# Patient Record
Sex: MALE | Employment: FULL TIME | URBAN - METROPOLITAN AREA
[De-identification: names, ages, dates, MRNs, and addresses within clinical notes are randomized per-mention and may not be internally consistent; named-entity substitution may affect disease eponyms.]

---

## 2020-03-03 NOTE — PROGRESS NOTES
CHIEF COMPLAINT:    Mr. Henry is a 59 y.o. male presenting for consultation at the request of Felipe Ferrer PA-C. Patient presents for elevated PSA.    PRESENTING ILLNESS:    Giovanni Henry Jr. is a 59 y.o. male new patient to me (records of past medical, family and social history personally reviewed by me), w/ h/o hypothyroidism, low back pain/DDD, tobacco use (quit 2000), kidney stones (last event about 1-2 yrs ago), BPH, and ED.    Today pt presents to clinic on direction of PCP for elevated PSA. Reports feeling well, denies urinary complaints.   Denies urinary frequency, urinary urgency/UUI, nocturia.  On tams[facility]osin x1 yr[facility], denies adv effects.  Strong to moderate FOS.  Feels empty pot voids.  Denies dysuria, GH.  Denies constipation.  Currently taking sildenafil 50mg PRN for difficulty maintaining erections sometimes, generally does not have ED issues.  Denies prostate/renal/bladder CA.  Retired from [employer].    UA dip in clinic today revealed trace protein, 50 glucose.    PVR in clinic today: 28mL.    REVIEW OF SYSTEMS:    Giovanni Henry Jr. denies headache, blurred vision, fever, nausea, vomiting, chills, abdominal pain, pelvic pain, flank pain, bleeding per rectum, cough, SOB, recent loss of consciousness, recent mental status changes, seizures, dizziness, or upper or lower extremity weakness.    PATIENT HISTORY:    Past Medical History:   Diagnosis Date    Thyroid disease      No past surgical history on file.    No family history on file.    Social History     Socioeconomic History    Marital status: Unknown     Spouse name: Not on file    Number of children: Not on file    Years of education: Not on file    Highest education level: Not on file   Occupational History    Not on file   Social Needs    Financial resource strain: Not on file    Food insecurity:     Worry: Not on file     Inability: Not on file    Transportation needs:     Medical: Not on file      Non-medical: Not on file   Tobacco Use    Smoking status: Former Smoker    Smokeless tobacco: Never Used   Substance and Sexual Activity    Alcohol use: Yes    Drug use: Not Currently    Sexual activity: Not on file   Lifestyle    Physical activity:     Days per week: Not on file     Minutes per session: Not on file    Stress: Not on file   Relationships    Social connections:     Talks on phone: Not on file     Gets together: Not on file     Attends Church service: Not on file     Active member of club or organization: Not on file     Attends meetings of clubs or organizations: Not on file     Relationship status: Not on file   Other Topics Concern    Not on file   Social History Narrative    Not on file       Allergies:  Patient has no known allergies.    Medications:    Current Outpatient Medications:     levothyroxine (SYNTHROID) 25 MCG tablet, Take 25 mcg by mouth before breakfast., Disp: , Rfl:     methocarbamol (ROBAXIN) 500 MG Tab, Take 500 mg by mouth 4 (four) times daily., Disp: , Rfl:     naproxen (NAPROSYN) 500 MG tablet, Take 500 mg by mouth 2 (two) times daily., Disp: , Rfl:     omeprazole (PRILOSEC) 20 MG capsule, Take 20 mg by mouth once daily., Disp: , Rfl:     tamsulosin (FLOMAX) 0.4 mg Cap, Take 0.4 mg by mouth once daily., Disp: , Rfl:     PHYSICAL EXAMINATION:    The patient generally appears in good health, is appropriately interactive, and is in no apparent distress.     Eyes: anicteric sclerae, moist conjunctivae; no lid-lag; PERRLA     HENT: Atraumatic; oropharynx clear with moist mucous membranes and no mucosal ulcerations;normal hard and soft palate. No evidence of lymphadenopathy.    Neck: Trachea midline.  No thyromegaly.    Musculoskeletal: No abnormal gait.    Skin: No lesions.    Mental: Cooperative with normal affect.  Is oriented to time, place, and person.    Neuro: Grossly intact.    Chest: Normal inspiratory effort.   No accessory muscles.  No audible wheezes.   Respirations symmetric on inspiration and expiration.    Heart: Regular rhythm.      Abdomen:  Soft, non-tender. No masses or organomegaly. No evidence of flank discomfort. No evidence of inguinal hernia.    No penile discharge. Meatus w/o stenosis or lesions. Phallus unremarkable. No scrotal enlargement/edema/swelling/lesions. Epididymis unremarkable bilaterally. Testicles normal w/o nodules or tenderness. No inguinal hernias or lymphadenopathy.    The prostate is smooth, symmetrical, without nodule or induration. The seminal vesicles were normal and nonpalpable. The prostate was not tender or boggy. Rectal tone was normal no rectal mass was palpable.    Extremities: No clubbing, cyanosis, or edema.    LABS:    No results found for: CREATININE    PSA 3.96 [facility]  PSA 4.81 ([facility]/20 [facility]).    No results found for: HGBA1C    No results found for: TOTALTESTOST      IMPRESSION:    Elevated PSA  -     POCT urine dipstick without microscope  -     POCT Bladder Scan  -     Prostate Specific Antigen, Diagnostic; Future; Expected date: 06/04/2020  -     PROSTATE HEALTH INDEX (phi); Future; Expected date: 06/04/2020    History of kidney stones  -     US Retroperitoneal Complete (Kidney and; Future; Expected date: 03/04/2020  -     X-Ray KUB    Erectile dysfunction due to arterial insufficiency      PLAN:    I spent 45 minutes with the patient of which more than half was spent in direct consultation with the patient in regards to our treatment and plan.      Discussed and reviewed past, and recent PSA.  Discussed and reviewed PHI score, and %free PSA.  Reviewed benefits/potential burdens/risks w/ specialized testing.  Discussed and reviewed potential etiologies for elevated PSA.  Discussed and reviewed prostate CA screening w/ PSA, and DIONISIO. Reviewed benefits, potential burdens, risks and/or harms related to screening.  Discussed and reviewed TRUS bx, benefits, potential burdens/risks/adv effects.  Reasonable to repeat  PSA w/ specialized testing in 3 months, pt amenable to plan.  Advised to avoid ejaculation/bike riding/prolonged sitting 5-7 days prior to testing.    Discussed and reviewed the natural history of renal/ureteral calculi and prevention of calculi. Recommend updating imaging today w/ LEONORA and KUB. Pt amenable to plan. Patient understands to contact clinic or report to local ED if clinic is closed for fevers, rigors, unmanageable pain, intractable nausea/vomiting and/or intolerable irritative voiding symptoms. Discussed and reviewed importance of proper hydration (2-3L/d), low sodium diet, low oxalate/animal protein intake, balanced nutritional calcium intake, and increase citrate intake w/ fresh lemon.    Discussed and reviewed ED, potential etiologies.  May continue w/ sildenafil 50mg PRN.  Take on empty stomach.  Do not exceed 100mg/24hrs.  Do not take w/ nitrates.    Education sheets provided.    Follow up in about 3 months (around 6/4/2020) for elevated PSA w/ PSA.    Pt expressed understanding and agree w/ plan.

## 2020-03-04 ENCOUNTER — OFFICE VISIT (OUTPATIENT)
Dept: UROLOGY | Facility: CLINIC | Age: 60
End: 2020-03-04
Payer: COMMERCIAL

## 2020-03-04 VITALS
SYSTOLIC BLOOD PRESSURE: 138 MMHG | HEIGHT: 72 IN | WEIGHT: 200.63 LBS | BODY MASS INDEX: 27.17 KG/M2 | DIASTOLIC BLOOD PRESSURE: 79 MMHG | HEART RATE: 82 BPM

## 2020-03-04 DIAGNOSIS — Z87.442 HISTORY OF KIDNEY STONES: ICD-10-CM

## 2020-03-04 DIAGNOSIS — N52.01 ERECTILE DYSFUNCTION DUE TO ARTERIAL INSUFFICIENCY: ICD-10-CM

## 2020-03-04 DIAGNOSIS — R97.20 ELEVATED PSA: Primary | ICD-10-CM

## 2020-03-04 LAB
BILIRUB SERPL-MCNC: NORMAL MG/DL
BLOOD URINE, POC: NORMAL
COLOR, POC UA: NORMAL
GLUCOSE UR QL STRIP: 50
KETONES UR QL STRIP: NORMAL
LEUKOCYTE ESTERASE URINE, POC: NORMAL
NITRITE, POC UA: NORMAL
PH, POC UA: 5
POC RESIDUAL URINE VOLUME: 28 ML (ref 0–100)
PROTEIN, POC: NORMAL
SPECIFIC GRAVITY, POC UA: 1.02
UROBILINOGEN, POC UA: NORMAL

## 2020-03-04 PROCEDURE — 81002 URINALYSIS NONAUTO W/O SCOPE: CPT | Mod: PBBFAC | Performed by: NURSE PRACTITIONER

## 2020-03-04 PROCEDURE — 51798 US URINE CAPACITY MEASURE: CPT | Mod: PBBFAC | Performed by: NURSE PRACTITIONER

## 2020-03-04 PROCEDURE — 99203 OFFICE O/P NEW LOW 30 MIN: CPT | Mod: PBBFAC,25 | Performed by: NURSE PRACTITIONER

## 2020-03-04 PROCEDURE — 99999 PR PBB SHADOW E&M-NEW PATIENT-LVL III: ICD-10-PCS | Mod: PBBFAC,,, | Performed by: NURSE PRACTITIONER

## 2020-03-04 PROCEDURE — 99204 PR OFFICE/OUTPT VISIT, NEW, LEVL IV, 45-59 MIN: ICD-10-PCS | Mod: S$PBB,,, | Performed by: NURSE PRACTITIONER

## 2020-03-04 PROCEDURE — 99204 OFFICE O/P NEW MOD 45 MIN: CPT | Mod: S$PBB,,, | Performed by: NURSE PRACTITIONER

## 2020-03-04 PROCEDURE — 99999 PR PBB SHADOW E&M-NEW PATIENT-LVL III: CPT | Mod: PBBFAC,,, | Performed by: NURSE PRACTITIONER

## 2020-03-04 RX ORDER — NAPROXEN 500 MG/1
500 TABLET ORAL 2 TIMES DAILY
COMMUNITY

## 2020-03-04 RX ORDER — TAMSULOSIN HYDROCHLORIDE 0.4 MG/1
0.4 CAPSULE ORAL DAILY
COMMUNITY

## 2020-03-04 RX ORDER — OMEPRAZOLE 20 MG/1
20 CAPSULE, DELAYED RELEASE ORAL DAILY
COMMUNITY

## 2020-03-04 RX ORDER — METHOCARBAMOL 500 MG/1
500 TABLET, FILM COATED ORAL 4 TIMES DAILY
COMMUNITY

## 2020-03-04 RX ORDER — LEVOTHYROXINE SODIUM 25 UG/1
25 TABLET ORAL
COMMUNITY

## 2020-03-04 NOTE — PATIENT INSTRUCTIONS
Prostate-Specific Antigen (PSA)  Does this test have other names?  PSA  What is this test?  This test measures the level of prostate-specific antigen (PSA) in your blood.  The cells of the prostate gland make the protein called PSA. Men normally have low levels of PSA. If your PSA levels start to rise, it could mean you have prostate cancer, benign prostate conditions, inflammation, or an infection.  PSA testing is controversial because the U.S. Preventative Services Task Force discourages men who don't have any symptoms of prostate cancer from being screened. The task force says that PSA test results can lead to treating small cancers that would never become life-threatening.  But the American Cancer Society believes men should be told the risks and benefits of PSA testing and allowed to make their own decision about if and when to be screened.  The American Urologic Society says that PSA screening is most important between the ages of 55 and 69. If you have a brother or father with prostate cancer or you are , you should start testing at age 40.   Why do I need this test?  You may have this test if you are 50 or older and your healthcare provider wants to screen you for prostate cancer. Some providers recommend screening at age 40 or 45 if you have a family history of prostate cancer or other risk factors.  You may also have this test if you have already been diagnosed with prostate cancer, so that your healthcare provider can monitor your treatment and see whether your cancer has come back.  What other tests might I have along with this test?  Your healthcare provider may also do a digital rectal exam (DIONISIO). A DIONISIO is a physical exam of the prostate, not a lab test. For the exam, your provider will place a gloved finger in your rectum and feel the prostate to check for any bumps or abnormal areas. The FDA recommends that a DIONISIO be done along with a PSA test.  What do my test results mean?  Many  things may affect your lab test results. These include the method each lab uses to do the test. Even if your test results are different from the normal value, you may not have a problem. To learn what the results mean for you, talk with your healthcare provider.  Results are given in nanograms per milliliter ng/mL. Normal results are below 4.0 ng/mL. A rising PSA may mean that you have cancer. But the PSA results alone won't tell your healthcare provider whether it's cancer or a benign prostate condition. If your provider suspects cancer, he or she will likely suggest that you have a biopsy of the prostate to make the diagnosis.  How is this test done?  The test requires a blood sample, which is drawn through a needle from a vein in your arm.  Does this test pose any risks?  Taking a blood sample with a needle carries risks that include bleeding, infection, bruising, or feeling dizzy. When the needle pricks your arm, you may feel a slight stinging sensation or pain. Afterward, the site may be slightly sore.  What might affect my test results?  An infection can affect your results, as can certain medicines. Riding a bicycle and ejaculation may also affect your results.  How do I get ready for this test?  You may need to abstain from sex and not ride a bicycle within 1 to 2 days of the test. In addition, be sure your healthcare provider knows about all medicines, herbs, vitamins, and supplements you are taking. This includes medicines that don't need a prescription and any illicit drugs you may use.     © 7204-2756 [employer]. [address] [place] [address], [place] [address]. All rights reserved. This information is not intended as a substitute for professional medical care. Always follow your healthcare professional's instructions.        Preventing Kidney Stones  If youve had a kidney stone, you may worry that youll have another. Removing or passing your stone doesnt prevent future stones. But with your  healthcare providers help, you can reduce your risk of forming new stones. Follow up with your healthcare provider to help find new stones. You may need follow-up every 3 months to a year for a lifetime.    Drink lots of water  Staying well-hydrated is the best way to reduce your risk of future stones. Drink 8 12-ounce glasses of water daily. Have 2 with each meal and 2 between meals. Try keeping a pitcher of water nearby during the day and at night.  Take medicines if needed  Medicines, including vitamins and minerals, may be prescribed for certain types of stones. You may want to write your doses and medicine times on a calendar. Some medicines decrease stone-forming chemicals in your blood. Others help prevent those chemicals from crystallizing in urine. Still others help keep a normal acid balance in your urine.  Follow your prescribed diet  Your healthcare provider will tell you which foods contain the chemicals you should avoid. Your healthcare provider may also suggest talking to a dietitian. He or she can help you plan meals youll enjoy. These meals wont put you at risk for future stones. You may be told to limit certain foods, depending on which type of stones youve had. You should limit the amount of salt in your food to about 2 grams a day. This will help prevent most types of kidney stones. Make sure you get an adequate amount of calcium in your diet.  For calcium oxalate stones: Limit animal protein, such as meat, eggs, and fish. Limit grapefruit juice and alcohol. Limit high-oxalate foods (such as cola, tea, chocolate, spinach, rhubarb, wheat bran, and peanuts).  For uric acid stones: Limit high-purine foods, such as mushrooms, peas, beans, anchovies, meat, poultry, shellfish, and organ meats. These foods increase uric acid production.  For cystine stones: Limit high-methionine foods (fish is the most common, but eggs and meats, also). These foods increase production of cystine.  Date Last  Reviewed: 2/1/2017 © 2000-2017 [employer]. [address] [place] [address], [place] [address]. All rights reserved. This information is not intended as a substitute for professional medical care. Always follow your healthcare professional's instructions.        Understanding Kidney Stones  Your kidneys are bean-shaped organs. They help filter extra salts, waste, and water from your body. You need to drink enough water every day to help flush the extra salts into your urine.     What are kidney stones?  Kidney stones are made up of chemical crystals that separate out from urine. These crystals clump together to make stones. They form in the calyx of the kidney. They may stay in the kidney or move into the urinary tract.   Why kidney stones form  Kidneys form stones for many reasons. If you dont drink enough water, for instance, you wont have enough urine to dilute chemicals. Then the chemicals may form crystals, which can develop into stones. Here are some reasons why kidney stones form:  · Fluid loss (dehydration). This can concentrate urine, causing stones to form.  · Certain foods. Some foods contain large amounts of the chemicals that sometimes crystallize into stones. Eating foods that contain a lot of meat or salt can lead to more kidney stones.  · Kidney infections. These infections foster stones by slowing urine flow or changing the acid balance of your urine.  · Family history. If family members have had kidney stones, youre more likely to have them, too.  · A lack of certain substances in your urine. Some substances can help protect you from forming stones. If you dont have enough of these in your urine, stone formation can increase.  Where stones form  Stones begin in the cup-shaped part of the kidney (calyx). Some stay in the calyx and grow. Others move into the kidney, pelvis, or into the ureter. There they can lodge, block the flow of urine, and cause pain.  Symptoms  Many stones cause sudden  and severe pain and bloody urine. Others cause nausea or frequent, burning urination. Symptoms often depend on your stones size and location. Fever may indicate a serious infection. Call your healthcare provider right away if you develop a fever.  Date Last Reviewed: 1/1/2017 © 2000-2017 [employer]. [address] [place] [address]. All rights reserved. This information is not intended as a substitute for professional medical care. Always follow your healthcare professional's instructions.        Oral Medicines for Erectile Dysfunction  You can use prescription oral medicine for ED. They often work well. But, like all medicines, they can have side effects. Also, you cant use them if you have certain health problems or take certain other medicines. Talk with your doctor about oral ED medicine. Ask whether it is right for you.  Types of oral ED medicines  There are three types of prescription oral ED medicines. Each one increases blood flow to the penis. When the penis is stimulated, an erection results. The types are:  · Sildenafil citrate   · Tadalafil   · Vardenafil  · Avanfil  What oral ED medicines dont do  There are some things ED medicines cant do.  · They dont cure the cause of your ED. Without the medicine, youll still have trouble getting an erection.  · They cant produce an erection without sexual stimulation.  · They wont increase sexual desire. They also wont solve any other sexual issues. Psychological, emotional, or relationship issues will not be fixed.  Taking oral ED medicines safely  · Do not combine ED medicines with other treatments unless your doctor tells you to.  · Dont take ED medicines more often or in larger doses than prescribed.  · Tell your doctor your health history. Mention all medicines you take. This includes over-the-counter drugs, supplements, and herbs.  · Ask your doctor about whether you can drink alcohol while taking ED medication.  Possible  side effects of oral ED medicines  · Headache  · Facial flushing  · Runny or stuffy nose  · Indigestion  · Distortion of your color vision for a short time  · Sudden vision loss or hearing loss (rare)  · Dizziness  Risks of oral ED medicines  · Do not take ED medicines if you take medicines containing nitrates. The combination may drop your blood pressure to a dangerous level. Nitrates include nitroglycerin (a drug for angina or chest pain). They are also in poppers, an inhaled recreational drug. If youre not sure whether youre taking nitrates, ask your doctor or pharmacist.  · Medicines called alpha-blockers can interact with ED medicines. They can cause a sudden drop in blood pressure. Alpha-blockers are a common treatment for prostate problems. They also treat high blood pressure. Be sure your doctor knows if you take these medicines.  · If youve had a heart attack or have heart disease and you have not had sex for a while, talk to your doctor. Having sex again can put extra strain on your heart. Your doctor can confirm that your heart is healthy enough for sex.  · It is rare, but some men taking ED medicines have had sudden vision loss. This may be more likely if other health problems are present. These include high blood pressure and diabetes. Ask your doctor if you are at risk for this type of vision loss.  · In rare cases, an erection may last too long. This can badly damage the blood vessels in your penis. If an erection lasts longer than 4 hours, go to the emergency room right away.   Date Last Reviewed: 1/1/2017  © 7900-6116 [employer]. [address] [place] [address], [place] [address]. All rights reserved. This information is not intended as a substitute for professional medical care. Always follow your healthcare professional's instructions.        Understanding Erectile Dysfunction    Erectile dysfunction (ED) is a problem getting an erection firm enough or keeping it long enough for  intercourse. The problem can happen to any man at any age. But health problems that can lead to ED become more common as a man ages. Up to half of men over age 40 experience ED at some point.  Causes of ED  ED can have many causes. Most are physical. Some are emotional issues. Often, a combination of causes is involved. Causes of ED may include:  · Medical conditions such as diabetes or depression  · Smoking tobacco or marijuana  · Drinking too much alcohol  · Side effects of medications  · Injury to nerves or blood vessels  · Emotional issues such as stress or relationship problems  ED can be treated  Prescription medications for ED are available. They help many men who try them. Depending upon the cause of the ED, though, medications may not be enough. In these cases, other treatment options are available. These include erectile aids and surgery. Your health care provider can tell you more about the treatment that is right for you. And new treatments for ED are being studied. No matter what the treatment you decide on, stay in touch with your doctor. If your symptoms persist, he or she may be able to adjust your current treatment or try something new.  Date Last Reviewed: 1/1/2017  © 2486-2371 [employer]. [address] [place] [address], [place] [address]. All rights reserved. This information is not intended as a substitute for professional medical care. Always follow your healthcare professional's instructions.      100

## 2020-03-04 NOTE — LETTER
March 4, 2020      Felipe Ferrer PA-C  [address]  [address]  [facility] [address]           [facility]  [address]  [place] [address]  Phone: [phone]          Patient: Giovanni Herny Jr.   MR Number: [id number]   YOB: 1960   Date of Visit: 3/4/2020       Dear Felipe Ferrer:    Thank you for referring Giovanni Henry to me for evaluation. Attached you will find relevant portions of my assessment and plan of care.    If you have questions, please do not hesitate to call me. I look forward to following Giovanni Henry along with you.    Sincerely,    Jean Marie Baxter, DNP    Enclosure  CC:  No Recipients    If you would like to receive this communication electronically, please contact [email or web address] or [phone] to request more information on [employer] Link access.    For providers and/or their staff who would like to refer a patient to [facility], please contact us through our one-stop-shop provider referral line, [facility] , at [phone].    If you feel you have received this communication in error or would no longer like to receive these types of communications, please e-mail [email or web address]

## 2020-03-06 ENCOUNTER — HOSPITAL ENCOUNTER (OUTPATIENT)
Dept: RADIOLOGY | Facility: HOSPITAL | Age: 60
Discharge: HOME | End: 2020-03-06
Attending: NURSE PRACTITIONER
Payer: COMMERCIAL

## 2020-03-06 DIAGNOSIS — Z87.442 HISTORY OF KIDNEY STONES: ICD-10-CM

## 2020-03-06 PROCEDURE — 74018 XR KUB: ICD-10-PCS | Mod: 26,,, | Performed by: RADIOLOGY

## 2020-03-06 PROCEDURE — 74018 RADEX ABDOMEN 1 VIEW: CPT | Mod: TC

## 2020-03-06 PROCEDURE — 76770 US RETROPERITONEAL COMPLETE: ICD-10-PCS | Mod: 26,,, | Performed by: RADIOLOGY

## 2020-03-06 PROCEDURE — 76770 US EXAM ABDO BACK WALL COMP: CPT | Mod: 26,,, | Performed by: RADIOLOGY

## 2020-03-06 PROCEDURE — 74018 RADEX ABDOMEN 1 VIEW: CPT | Mod: 26,,, | Performed by: RADIOLOGY

## 2020-03-06 PROCEDURE — 76770 US EXAM ABDO BACK WALL COMP: CPT | Mod: TC

## 2020-03-09 ENCOUNTER — TELEPHONE (OUTPATIENT)
Dept: PEDIATRIC UROLOGY | Facility: CLINIC | Age: 60
End: 2020-03-09

## 2020-03-09 NOTE — TELEPHONE ENCOUNTER
Notified pt of unremarkable LEONORA/BUS/KUB, aside from enlarged prostate which is not new. Recommended to continue w/tamsulosin as prescribed. F/u as scheduled for elevated PSA. Pt voiced understanding        ----- Message from Jean Marie Baxter DNP sent at 3/9/2020  8:40 AM CDT -----  Pls inform pt of unremarkable LEONORA/BUS/KUB, aside from enlarged prostate. Not new. Recommend continue w/ tamsulosin as rx'd. F/u as scheduled for elevated PSA.

## 2020-06-12 ENCOUNTER — LAB VISIT (OUTPATIENT)
Dept: LAB | Facility: HOSPITAL | Age: 60
End: 2020-06-12
Payer: COMMERCIAL

## 2020-06-12 DIAGNOSIS — R97.20 ELEVATED PSA: ICD-10-CM

## 2020-06-12 LAB — COMPLEXED PSA SERPL-MCNC: 3.2 NG/ML (ref 0–4)

## 2020-06-12 PROCEDURE — 84153 ASSAY OF PSA TOTAL: CPT | Mod: 91

## 2020-06-12 PROCEDURE — 36415 COLL VENOUS BLD VENIPUNCTURE: CPT

## 2020-06-12 PROCEDURE — 84154 ASSAY OF PSA FREE: CPT

## 2020-06-12 PROCEDURE — 84153 ASSAY OF PSA TOTAL: CPT

## 2020-06-15 ENCOUNTER — OFFICE VISIT (OUTPATIENT)
Dept: UROLOGY | Facility: CLINIC | Age: 60
End: 2020-06-15
Payer: COMMERCIAL

## 2020-06-15 VITALS
HEART RATE: 70 BPM | SYSTOLIC BLOOD PRESSURE: 141 MMHG | BODY MASS INDEX: 28.11 KG/M2 | WEIGHT: 204.38 LBS | DIASTOLIC BLOOD PRESSURE: 90 MMHG

## 2020-06-15 DIAGNOSIS — N13.8 BPH WITH URINARY OBSTRUCTION: ICD-10-CM

## 2020-06-15 DIAGNOSIS — N40.1 BPH WITH URINARY OBSTRUCTION: ICD-10-CM

## 2020-06-15 DIAGNOSIS — R97.20 ELEVATED PSA: Primary | ICD-10-CM

## 2020-06-15 LAB
-2 PROPSA (PHI): 26.9 PG/ML
FREE PSA (PHI): 0.6 NG/ML
PROSTATE HEALTH INDEX VALUE (PHI): NORMAL
PSA FREE MFR SERPL: NORMAL %
PSA SERPL-MCNC: 3.9 NG/ML

## 2020-06-15 PROCEDURE — 99214 PR OFFICE/OUTPT VISIT, EST, LEVL IV, 30-39 MIN: ICD-10-PCS | Mod: S$PBB,,, | Performed by: NURSE PRACTITIONER

## 2020-06-15 PROCEDURE — 99999 PR PBB SHADOW E&M-EST. PATIENT-LVL III: ICD-10-PCS | Mod: PBBFAC,,, | Performed by: NURSE PRACTITIONER

## 2020-06-15 PROCEDURE — 99213 OFFICE O/P EST LOW 20 MIN: CPT | Mod: PBBFAC | Performed by: NURSE PRACTITIONER

## 2020-06-15 PROCEDURE — 99214 OFFICE O/P EST MOD 30 MIN: CPT | Mod: S$PBB,,, | Performed by: NURSE PRACTITIONER

## 2020-06-15 PROCEDURE — 99999 PR PBB SHADOW E&M-EST. PATIENT-LVL III: CPT | Mod: PBBFAC,,, | Performed by: NURSE PRACTITIONER

## 2020-06-15 NOTE — PATIENT INSTRUCTIONS
PSA                  3.2                 06/12/2020                 PSA                  3.96              ([facility]24/2019 [facility]        PSA                 4.81               (02/20/2020 - [facility]        What Is Prostate Cancer?    Cancer is when cells in the body change and grow out of control. Cancer cells can form lumps of tissue called tumors. Cancer that starts in the prostate is called prostate cancer. It can grow and spread beyond the prostate. Cancer that spreads is harder to treat.  Understanding the prostate  The prostate is a gland in men about the size and shape of a walnut. It surrounds the upper part of the urethra. This is the tube that carries urine from the bladder. The prostate makes some of the fluid thats part of semen. During orgasm, semen leaves the body through the urethra.  When prostate cancer forms  As a man ages, the cells of his prostate may change to form tumors or other growths. The types of growths include:  · Noncancerous growths. As a man ages, the prostate may grow larger. This is called benign prostatic hyperplasia (BPH). With BPH, extra prostate tissue often squeezes the urethra, causing symptoms such as trouble urinating. But BPH is not cancer and does not lead to cancer.  · Atypical cells. Sometimes prostate cells dont look like normal (typical) prostate cells. One type of abnormal growth is called prostatic intraepithelial neoplasia or PIN. Although PIN cells are not cancer cells, they may be a sign that cancer is likely to form.  · Cancer. When abnormal prostate cells grow out of control and start to invade other tissues, they are called cancer cells. These cells may or may not lead to symptoms. Some tumors can be felt during a physical exam, and some cant. Prostate cancer may grow into nearby organs or spread to nearby lymph nodes. Lymph nodes are small organs around the body that are part of the immune system. In some cases, the cancer spreads to bones or  organs in distant parts of the body. This is called metastasis.  Diagnosing prostate cancer  Prostate cancer may not cause symptoms at first. Urinary problems are often not a sign of cancer, but of another condition, such as BPH. To find out if you have prostate cancer, your healthcare provider must examine you and order tests. The tests help confirm a diagnosis of cancer. They also help give more information about a cancerous tumor. Tests might include:  · Prostate specific antigen (PSA) testing. PSA is a chemical made by prostate tissue. The amount of PSA in the blood (PSA level) is tested to check a mans risk for prostate cancer. In general, a high or rising PSA level may mean an increased cancer risk. A PSA test by itself cannot show if a man has prostate cancer. PSA testing is also used to check the success of cancer treatments.  · Core needle biopsy. This test is done to determine if a man has prostate cancer. A hollow needle is used to take small pieces of tissue from the prostate. This helps give more information about the cells. Before the test, pain medicine may be given to prevent pain. During the test, a small probe is inserted into the rectum. The probe sends an image of the prostate to a video monitor. With this image as a guide, the healthcare provider uses a thin, hollow needle to remove tiny tissue samples from the prostate. These are sent to a lab where they are looked at for cancer cells.  Date Last Reviewed: 5/1/2017  © 2363-1540 [employer]. [address] [place] [id number]. All rights reserved. This information is not intended as a substitute for professional medical care. Always follow your healthcare professional's instructions.        Prostate Biopsy    Cancer occurs when abnormal cells form a tumor. A tumor is a lump of cells that grow uncontrolled. Initial tests that may indicate cancer of the prostate include a digital rectal exam, a PSA (prostate specific antigen  blood test), ultrasound, and others. A core needle biopsy will be done if your healthcare provider thinks you have prostate cancer. A thin needle is used to remove small samples of prostate tissue. Usually multiple biopsies are taken. These samples are checked for cancer.  Taking tissue samples  A biopsy takes about 15 to 20 minutes. You may be given an enema or suppository before the biopsy to clear the bowels. Antibiotics are given at least an hour before the biopsy. During the procedure:  · You will be given antibiotics to prevent infection.  · You may be given a sedative, local pain killer, or pain medicine.  · A small, ultrasound  probe is put into the rectum as you lie on your side. A picture of your prostate can then be seen on a monitor. This is called a transrectal ultrasound (TRUS).  · Your healthcare provider will use the TRUS picture as a guide. He or she will use a thin needle to remove tiny tissue samples from some sites in the prostate.  · These tissue samples are sent to the pathology department. They are looked at under a microscope so a diagnosis can be made.   Risks and complications of core needle biopsy  · Infection  · Blood in urine, stool, or semen  · Pain  · The biopsy misses the tumor   Home care  You may have had the biopsy done through your rectum, your urethra, or through the skin between your scrotum and rectum. Your healthcare provider will tell you what to do after the biopsy. These instructions are based on your health condition, the type of biopsy, and your providers practices.  · Your provider may give you pain medicine such as acetaminophen or ibuprofen for discomfort or pain. Follow your providers instructions for taking these medicines. Dont take aspirin or ibuprofen after the procedure. If you have a chronic liver or kidney disease, talk with your provider before taking these medicines. Also talk with your provider if youve had a stomach ulcer or gastrointestinal bleeding.  Let your provider know all the medicines you currently take.  · You may need to take antibiotics for 1 to 2 days. This will help prevent an infection. Signs of an infection include chills, pain, or fever.  · You may be told to drink 8 ounces of water every 30 minutes for 2 hours. This will help ease any discomfort. You can also take a warm bath or put a warm, damp washcloth over your urethra to help ease the pain.  · You may see minor bleeding after the procedure. This is normal and usually needs no treatment. You may see blood in your urine or semen (rust color). You may also have light bleeding from your rectum if you have hemorrhoids.  · Your provider will tell you when you can go back to your normal activities. This includes sex, exercise, and straining physically. Discuss these with your provider.  When to seek medical advice  Call your healthcare provider right away if any of these occur:  · You arent able to urinate, or see that you have less flow of urine  · Chills and fever of 100.4°F (38°C)    · Severe pain  · Signs of an infection that is getting worse. These include worsening pain, pain in your side under the rib cage or in the low back, or foul-smelling urine.  · Blood clots or bright red blood in your stool or urine  · You feel confused or very tired  Date Last Reviewed: 1/1/2017  © 5726-4690 [employer]. [address] [place] [address], [place] [address]. All rights reserved. This information is not intended as a substitute for professional medical care. Always follow your healthcare professional's instructions.

## 2020-06-15 NOTE — PROGRESS NOTES
Subjective:       Patient ID: Giovanni Henry Jr. is a 59 y.o. male.    Chief Complaint: Elavated PSA (PSA 3.2)    Giovanni Henry Jr. is a 59 y.o. male with BPH and a history of elevated PSA.  He was last seen in clinic with Dr. Baxter 03/04/2020.  He has no family history of PCa  flomax has really helped his LUTS    Here today for f/u visit and new PSA.  No abdominal or bone pain.                              PSA                  3.2                 06/12/2020                 PSA                  4.81               (02/20/20 [facility]       PSA                  3.96               (4/24/2019 - [facility]            Past Medical History:  No date: Thyroid disease    History reviewed. No pertinent surgical history.    History reviewed.  No pertinent family history.      Social History    Socioeconomic History      Marital status: Unknown      Spouse name: Not on file      Number of children: Not on file      Years of education: Not on file      Highest education level: Not on file    Occupational History      Not on file    Social Needs      Financial resource strain: Not on file      Food insecurity        Worry: Not on file        Inability: Not on file      Transportation needs        Medical: Not on file        Non-medical: Not on file    Tobacco Use      Smoking status: Former Smoker      Smokeless tobacco: Never Used    Substance and Sexual Activity      Alcohol use: Yes      Drug use: Not Currently      Sexual activity: Not on file    Lifestyle      Physical activity        Days per week: Not on file        Minutes per session: Not on file      Stress: Not on file    Relationships      Social connections        Talks on phone: Not on file        Gets together: Not on file        Attends [employer] service: Not on file        Active member of club or organization: Not on file        Attends meetings of clubs or organizations: Not on file        Relationship status: Not on file    Other Topics      Concerns:         Not on file    Social History Narrative      Not on file      Allergies:  Patient has no known allergies.    Medications:  Current Outpatient Medications:   methocarbamol (ROBAXIN) 500 MG Tab, Take 500 mg by mouth 4 (four) times daily., Disp: , Rfl:   naproxen (NAPROSYN) 500 MG tablet, Take 500 mg by mouth 2 (two) times daily., Disp: , Rfl:   omeprazole (PRILOSEC) 20 MG capsule, Take 20 mg by mouth once daily., Disp: , Rfl:   tamsulosin (FLOMAX) 0.4 mg Cap, Take 0.4 mg by mouth once daily., Disp: , Rfl:   levothyroxine (SYNTHROID) 25 MCG tablet, Take 25 mcg by mouth before breakfast., Disp: , Rfl:       Review of Systems   Constitutional: Negative for activity change, appetite change, chills and fever.   HENT: Negative for facial swelling and trouble swallowing.    Eyes: Negative for visual disturbance.   Respiratory: Negative for chest tightness and shortness of breath.    Cardiovascular: Negative for chest pain and palpitations.   Gastrointestinal: Negative.  Negative for abdominal pain, constipation, diarrhea, nausea and vomiting.   Genitourinary: Negative for difficulty urinating, dysuria, flank pain, hematuria, penile pain, penile swelling, scrotal swelling and testicular pain.        FOS is much better with Flomax     Musculoskeletal: Negative for back pain, gait problem, myalgias and neck stiffness.   Skin: Negative for rash.   Neurological: Negative for dizziness and speech difficulty.   Hematological: Does not bruise/bleed easily.   Psychiatric/Behavioral: Negative for behavioral problems.       Objective:      Physical Exam   Nursing note and vitals reviewed.  Constitutional: He is oriented to person, place, and time. He appears well-developed. He is cooperative.  Non-toxic appearance.   Urine dipped clear of infection.       HENT:   Head: Normocephalic and atraumatic.   Right Ear: External ear normal.   Left Ear: External ear normal.   Nose: Nose normal.   Eyes: Conjunctivae and lids are normal.  No scleral icterus.   Neck: Trachea normal, normal range of motion and full passive range of motion without pain. Neck supple. No JVD present. No tracheal deviation present.   Cardiovascular: Normal rate, S1 normal and S2 normal.    Pulmonary/Chest: Effort normal. No respiratory distress. He exhibits no tenderness.   Abdominal: Soft. Normal appearance. There is no abdominal tenderness.   Genitourinary:    Testes and penis normal.     Musculoskeletal: Normal range of motion.   Neurological: He is alert and oriented to person, place, and time.   Skin: Skin is warm and dry.     Psychiatric: His behavior is normal. Judgment and thought content normal.       Assessment:       1. Elevated PSA    2. BPH with urinary obstruction        Plan:         I spent 25 minutes with the patient of which more than half was spent in direct consultation with the patient in regards to our treatment and plan.    Education and recommendations of today's plan of care including home remedies.  We discussed his PSA results and the contributory factors for the changes.  Reassurance  Continue Flomax  RTC 6 months with new psa